# Patient Record
Sex: FEMALE | Race: WHITE | Employment: FULL TIME | URBAN - METROPOLITAN AREA
[De-identification: names, ages, dates, MRNs, and addresses within clinical notes are randomized per-mention and may not be internally consistent; named-entity substitution may affect disease eponyms.]

---

## 2020-07-20 ENCOUNTER — OFFICE VISIT (OUTPATIENT)
Dept: FAMILY MEDICINE | Facility: CLINIC | Age: 20
End: 2020-07-20
Payer: COMMERCIAL

## 2020-07-20 VITALS
BODY MASS INDEX: 20.92 KG/M2 | TEMPERATURE: 98.3 F | HEIGHT: 68 IN | DIASTOLIC BLOOD PRESSURE: 74 MMHG | OXYGEN SATURATION: 100 % | RESPIRATION RATE: 18 BRPM | SYSTOLIC BLOOD PRESSURE: 107 MMHG | WEIGHT: 138 LBS | HEART RATE: 84 BPM

## 2020-07-20 DIAGNOSIS — R07.89 OTHER CHEST PAIN: Primary | ICD-10-CM

## 2020-07-20 RX ORDER — DESOGESTREL AND ETHINYL ESTRADIOL 0.15-0.03
1 KIT ORAL DAILY
COMMUNITY
Start: 2020-05-11

## 2020-07-20 SDOH — HEALTH STABILITY: MENTAL HEALTH: HOW OFTEN DO YOU HAVE A DRINK CONTAINING ALCOHOL?: 2-4 TIMES A MONTH

## 2020-07-20 SDOH — HEALTH STABILITY: MENTAL HEALTH: HOW MANY STANDARD DRINKS CONTAINING ALCOHOL DO YOU HAVE ON A TYPICAL DAY?: 1 OR 2

## 2020-07-20 SDOH — HEALTH STABILITY: MENTAL HEALTH: HOW OFTEN DO YOU HAVE 6 OR MORE DRINKS ON ONE OCCASION?: LESS THAN MONTHLY

## 2020-07-20 ASSESSMENT — MIFFLIN-ST. JEOR: SCORE: 1444.33

## 2020-07-20 NOTE — PROGRESS NOTES
Chief Complaint   Patient presents with     Establish Care     on and off chest pains for about 4 yrs now. No actual testings have been performed.      Medication Reconciliation     Add contraceptives from [facility]. Otherwise not taking any meds at this time.             HPI:       Marisa Nur is a 20 year old  female without a significant past medical history who presents for the new concern(s) of    1. Chest pain: 4 years.  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 Has a sharp pain sometimes last a few seconds, does not stop her from doing her activities. No other symptoms, does not radiate, only once did it go to her left shoulder while on a walk with her mom, no diaphoresis, no nausea/vomiting. Unsure of how the pain goes away, tries to make her chest more open/stretches.   Has been on OCP for 2 years. No swelling in lower legs.Caffeine, 1 cup of coffee per day, no energy drinks, no dietary supplements, no diet sodas    Does not work out, no tobacco, no diabetes or high blood pressure, maternal grandmother with diabetes, no family history of sudden death or cardiac disease. No history of blood clots.     Plans to go to school [employer] at education degree in secondary math.   Does plan to go back to school in the fall.         PMHX:     There is no problem list on file for this patient.      Current Outpatient Medications   Medication Sig Dispense Refill     APRI 0.15-30 MG-MCG tablet Take 1 tablet by mouth daily         Social History     Socioeconomic History     Marital status: Single     Spouse name: Not on file     Number of children: Not on file     Years of education: Not on file     Highest education level: Not on file   Occupational History     Not on file   Social Needs     Financial resource strain: Not on file     Food insecurity     Worry: Not on file     Inability: Not  "on file     Transportation needs     Medical: Not on file     Non-medical: Not on file   Tobacco Use     Smoking status: Never Smoker     Smokeless tobacco: Never Used   Substance and Sexual Activity     Alcohol use: Yes     Frequency: 2-4 times a month     Drinks per session: 1 or 2     Binge frequency: Less than monthly     Drug use: Never     Sexual activity: Never   Lifestyle     Physical activity     Days per week: Not on file     Minutes per session: Not on file     Stress: Not on file   Relationships     Social connections     Talks on phone: Not on file     Gets together: Not on file     Attends Zoroastrianism service: Not on file     Active member of club or organization: Not on file     Attends meetings of clubs or organizations: Not on file     Relationship status: Not on file     Intimate partner violence     Fear of current or ex partner: Not on file     Emotionally abused: Not on file     Physically abused: Not on file     Forced sexual activity: Not on file   Other Topics Concern     Not on file   Social History Narrative     Not on file        No Known Allergies    No results found for this or any previous visit (from the past 24 hour(s)).         Review of Systems:   C: NEGATIVE for fatigue, unexpected change in weight  E: NEGATIVE for acute vision problems or changes  R: NEGATIVE for significant cough or shortness of breath  PSYCHIATRIC: Positive for feelings of overwhelm          Physical Exam:     Vitals:    07/20/20 0820   BP: 107/74   Pulse: 84   Resp: 18   Temp: 98.3  F (36.8  C)   TempSrc: Oral   SpO2: 100%   Weight: 62.6 kg (138 lb)   Height: 1.735 m (5' 8.31\")     Body mass index is 20.79 kg/m .    GENERAL APPEARANCE: healthy, alert and no distress,  EYES: Eyes grossly normal to inspection,  PERRL  HENT: ear canals and TM's normal and nose and mouth without ulcers or lesions  NECK: no adenopathy, no asymmetry, masses, or scars and thyroid normal to palpation  RESP: lungs clear to auscultation - " no rales, rhonchi or wheezes  CV: regular rate and rhythm,  and no murmur, click,  rub or gallop  ABDOMEN: soft, nontender, without hepatosplenomegaly or masses  MS: extremities normal- no gross deformities noted    EKG was performed and personally reviewed. NSR. No acute findings.   Assessment and Plan     1. Other chest pain  Her chest pain does not seem to be consistent with cardiac etiology as due to duration of symptoms and minimal risk factors. Also does not seem to be PE with intermittent symptoms, O2 sat of 100% but is on OCPs. Could be due to anxiety however her BRANDO is 2.  - EKG 12-lead complete w/read - Clinics    Reassurance provided. Recommended she start a yoga practice to stretch her pectoralis muscles and help ease her feelings of anxiety.  Discussed the option of medications and/or therapy. She will think about it and let us know.    She will consider an appointment for pap/pelvic in the near future.      Options for treatment and follow-up care were reviewed with the patient and/or guardian. Marisa Nur and/or guardian engaged in the decision making process and verbalized understanding of the options discussed and agreed with the final plan.    Phuong Hendrickson,

## 2020-07-21 ASSESSMENT — ANXIETY QUESTIONNAIRES
3. WORRYING TOO MUCH ABOUT DIFFERENT THINGS: SEVERAL DAYS
GAD7 TOTAL SCORE: 2
1. FEELING NERVOUS, ANXIOUS, OR ON EDGE: SEVERAL DAYS
2. NOT BEING ABLE TO STOP OR CONTROL WORRYING: NOT AT ALL
IF YOU CHECKED OFF ANY PROBLEMS ON THIS QUESTIONNAIRE, HOW DIFFICULT HAVE THESE PROBLEMS MADE IT FOR YOU TO DO YOUR WORK, TAKE CARE OF THINGS AT HOME, OR GET ALONG WITH OTHER PEOPLE: NOT DIFFICULT AT ALL
6. BECOMING EASILY ANNOYED OR IRRITABLE: NOT AT ALL
7. FEELING AFRAID AS IF SOMETHING AWFUL MIGHT HAPPEN: NOT AT ALL
5. BEING SO RESTLESS THAT IT IS HARD TO SIT STILL: NOT AT ALL

## 2020-07-21 ASSESSMENT — PATIENT HEALTH QUESTIONNAIRE - PHQ9
5. POOR APPETITE OR OVEREATING: NOT AT ALL
SUM OF ALL RESPONSES TO PHQ QUESTIONS 1-9: 1

## 2020-07-22 ASSESSMENT — ANXIETY QUESTIONNAIRES: GAD7 TOTAL SCORE: 2

## 2021-01-04 ENCOUNTER — HEALTH MAINTENANCE LETTER (OUTPATIENT)
Age: 21
End: 2021-01-04

## 2021-05-28 ENCOUNTER — RECORDS - HEALTHEAST (OUTPATIENT)
Dept: ADMINISTRATIVE | Facility: CLINIC | Age: 21
End: 2021-05-28

## 2021-08-13 ENCOUNTER — HOSPITAL ENCOUNTER (EMERGENCY)
Facility: CLINIC | Age: 21
Discharge: HOME | End: 2021-08-13
Admitting: EMERGENCY MEDICINE
Payer: COMMERCIAL

## 2021-08-13 ENCOUNTER — APPOINTMENT (OUTPATIENT)
Dept: CT IMAGING | Facility: CLINIC | Age: 21
End: 2021-08-13
Payer: COMMERCIAL

## 2021-08-13 VITALS
DIASTOLIC BLOOD PRESSURE: 64 MMHG | RESPIRATION RATE: 16 BRPM | TEMPERATURE: 98 F | OXYGEN SATURATION: 100 % | BODY MASS INDEX: 21.85 KG/M2 | WEIGHT: 145 LBS | HEART RATE: 87 BPM | SYSTOLIC BLOOD PRESSURE: 113 MMHG

## 2021-08-13 DIAGNOSIS — N12 PYELONEPHRITIS: ICD-10-CM

## 2021-08-13 DIAGNOSIS — R10.31 RLQ ABDOMINAL PAIN: ICD-10-CM

## 2021-08-13 LAB
ALBUMIN UR-MCNC: NEGATIVE MG/DL
ANION GAP SERPL CALCULATED.3IONS-SCNC: 13 MMOL/L (ref 5–18)
APPEARANCE UR: CLEAR
BACTERIA #/AREA URNS HPF: ABNORMAL /HPF
BILIRUB UR QL STRIP: NEGATIVE
BUN SERPL-MCNC: 6 MG/DL (ref 8–22)
CALCIUM SERPL-MCNC: 9.1 MG/DL (ref 8.5–10.5)
CHLORIDE BLD-SCNC: 106 MMOL/L (ref 98–107)
CO2 SERPL-SCNC: 20 MMOL/L (ref 22–31)
COLOR UR AUTO: ABNORMAL
CREAT SERPL-MCNC: 0.76 MG/DL (ref 0.6–1.1)
ERYTHROCYTE [DISTWIDTH] IN BLOOD BY AUTOMATED COUNT: 13.3 % (ref 10–15)
GFR SERPL CREATININE-BSD FRML MDRD: >90 ML/MIN/1.73M2
GLUCOSE BLD-MCNC: 79 MG/DL (ref 70–125)
GLUCOSE UR STRIP-MCNC: NEGATIVE MG/DL
HCG UR QL: NEGATIVE
HCT VFR BLD AUTO: 37.6 % (ref 35–47)
HGB BLD-MCNC: 12.5 G/DL (ref 11.7–15.7)
HGB UR QL STRIP: ABNORMAL
INTERNAL QC OK POCT: NORMAL
KETONES UR STRIP-MCNC: ABNORMAL MG/DL
LEUKOCYTE ESTERASE UR QL STRIP: NEGATIVE
MCH RBC QN AUTO: 28.7 PG (ref 26.5–33)
MCHC RBC AUTO-ENTMCNC: 33.2 G/DL (ref 31.5–36.5)
MCV RBC AUTO: 86 FL (ref 78–100)
NITRATE UR QL: NEGATIVE
PH UR STRIP: 6 [PH] (ref 5–7)
PLATELET # BLD AUTO: 172 10E3/UL (ref 150–450)
POTASSIUM BLD-SCNC: 3.9 MMOL/L (ref 3.5–5)
RBC # BLD AUTO: 4.35 10E6/UL (ref 3.8–5.2)
RBC URINE: 2 /HPF
SODIUM SERPL-SCNC: 139 MMOL/L (ref 136–145)
SP GR UR STRIP: 1 (ref 1–1.03)
SQUAMOUS EPITHELIAL: 1 /HPF
UROBILINOGEN UR STRIP-MCNC: <2 MG/DL
WBC # BLD AUTO: 8.7 10E3/UL (ref 4–11)
WBC URINE: 3 /HPF

## 2021-08-13 PROCEDURE — 36415 COLL VENOUS BLD VENIPUNCTURE: CPT | Performed by: NURSE PRACTITIONER

## 2021-08-13 PROCEDURE — 250N000011 HC RX IP 250 OP 636: Performed by: NURSE PRACTITIONER

## 2021-08-13 PROCEDURE — 85014 HEMATOCRIT: CPT | Performed by: NURSE PRACTITIONER

## 2021-08-13 PROCEDURE — 99285 EMERGENCY DEPT VISIT HI MDM: CPT | Mod: 25

## 2021-08-13 PROCEDURE — 81001 URINALYSIS AUTO W/SCOPE: CPT | Performed by: NURSE PRACTITIONER

## 2021-08-13 PROCEDURE — 81025 URINE PREGNANCY TEST: CPT | Performed by: NURSE PRACTITIONER

## 2021-08-13 PROCEDURE — 80048 BASIC METABOLIC PNL TOTAL CA: CPT | Performed by: NURSE PRACTITIONER

## 2021-08-13 PROCEDURE — 96374 THER/PROPH/DIAG INJ IV PUSH: CPT | Mod: 59

## 2021-08-13 PROCEDURE — 74177 CT ABD & PELVIS W/CONTRAST: CPT

## 2021-08-13 RX ORDER — CEFDINIR 300 MG/1
300 CAPSULE ORAL 2 TIMES DAILY
Qty: 20 CAPSULE | Refills: 0 | Status: SHIPPED | OUTPATIENT
Start: 2021-08-13 | End: 2021-08-23

## 2021-08-13 RX ORDER — KETOROLAC TROMETHAMINE 15 MG/ML
15 INJECTION, SOLUTION INTRAMUSCULAR; INTRAVENOUS ONCE
Status: COMPLETED | OUTPATIENT
Start: 2021-08-13 | End: 2021-08-13

## 2021-08-13 RX ORDER — IOPAMIDOL 755 MG/ML
100 INJECTION, SOLUTION INTRAVASCULAR ONCE
Status: COMPLETED | OUTPATIENT
Start: 2021-08-13 | End: 2021-08-13

## 2021-08-13 RX ADMIN — IOPAMIDOL 100 ML: 755 INJECTION, SOLUTION INTRAVENOUS at 12:52

## 2021-08-13 RX ADMIN — KETOROLAC TROMETHAMINE 15 MG: 15 INJECTION, SOLUTION INTRAMUSCULAR; INTRAVENOUS at 12:15

## 2021-08-13 ASSESSMENT — ENCOUNTER SYMPTOMS
DYSURIA: 0
NAUSEA: 0
ABDOMINAL PAIN: 1
VOMITING: 0
FEVER: 0
FREQUENCY: 0

## 2021-08-13 NOTE — ED TRIAGE NOTES
Patient is here with RLQ pain that started last night and is continuing to worsen. She denies any issues with bowel or bladder.

## 2021-08-13 NOTE — ED PROVIDER NOTES
EMERGENCY DEPARTMENT ENCOUNTER      NAME: Marisa Nur  AGE: 21 year old female  YOB: 2000  MRN: [id number]  EVALUATION DATE & TIME: 2021 11:35 AM    PCP: Phuong Hendrickson    ED PROVIDER: NESSA Pisano CNP      Chief Complaint   Patient presents with     Abdominal Pain         FINAL IMPRESSION:  1. Pyelonephritis    2. RLQ abdominal pain          ED COURSE & MEDICAL DECISION MAKIN:42 AM I met with the patient, obtained history, performed an initial exam, and discussed options and plan for treatment here in the ED.  1:47 PM I rechecked and updated the patient. Updated her on the CT Abdomen Pelvis results.    Pertinent Labs & Imaging studies reviewed. (See chart for details)  21 year old female presents to the Emergency Department for evaluation of abdominal pain. Tender in the RLQ. No peritoneal signs. DDx include appendicitis, upper and lower UTI, ovarian cyst, colitis, ectopic pregnancy amongst other. CT abdomen and pelvis obtained. Shows a lesion in the right kidney and thickening of the bladder wall. Moderate bacterial in urine though WBC's, leukocyte esterase, or nitrites. We discussed the CT findings. At this point, infection would seem most likely. She is non toxic. Will start on omnicef. Recommend follow up in clinic next week. Advised to return for new / worsening symptoms or for other concerns. Recommend follow up CT in 3 months per radiologist recommendations.     At the conclusion of the encounter I discussed the results of all of the tests and the disposition. The questions were answered. The patient or family acknowledged understanding and was agreeable with the care plan.        PPE: Provider wore gloves, N95 mask, eye protection, and paper mask.   MEDICATIONS GIVEN IN THE EMERGENCY:  Medications   ketorolac (TORADOL) injection 15 mg (15 mg Intravenous Given 21 1215)   iopamidol (ISOVUE-370) solution 100 mL (100 mLs Intravenous Given 21 1252)       NEW  "PRESCRIPTIONS STARTED AT TODAY'S ER VISIT  Discharge Medication List as of 8/13/2021  2:41 PM      START taking these medications    Details   cefdinir (OMNICEF) 300 MG capsule Take 1 capsule (300 mg) by mouth 2 times daily for 10 days, Disp-20 capsule, R-0, Local Print                  =================================================================    HPI    Patient information was obtained from: The patient    Use of Intrepreter: N/A        Marisa Nur is a 21 year old female with a no recorded pertinent history who presents to the ED via walk-in for evaluation of abdominal pain.    The patient reports last night (8/12) the patient had onset of localized RLQ abdominal pain that has since progressively worsened. Movement is a provocative feature. Additionally, the patient notes \"nothing seems appetizing\" and has not had anything to eat today. She was drinking coffee on her way to the ED. Patient denies having similar symptoms before. The patient currently takes birth control, she has not taken any medications for her symptoms. The patient denies history of abdominal surgeries. Denies known allergies. Denies fever, nausea, vomiting, dysuria, vaginal bleeding, vaginal discharge, frequency, and any other symptoms or complaints at this time.       REVIEW OF SYSTEMS   Review of Systems   Constitutional: Negative for fever.   Gastrointestinal: Positive for abdominal pain (RLQ). Negative for nausea and vomiting.   Genitourinary: Negative for dysuria, frequency, vaginal bleeding and vaginal discharge.   All other systems reviewed and are negative.       PAST MEDICAL HISTORY:  History reviewed. No pertinent past medical history.    PAST SURGICAL HISTORY:  Past Surgical History:   Procedure Laterality Date     NO HISTORY OF SURGERY             CURRENT MEDICATIONS:    Prior to Admission Medications   Prescriptions Last Dose Informant Patient Reported? Taking?   APRI 0.15-30 MG-MCG tablet   Yes No   Sig: Take 1 tablet " by mouth daily      Facility-Administered Medications: None           ALLERGIES:  Allergies   Allergen Reactions     Adhesive Tape Rash       FAMILY HISTORY:  Family History   Problem Relation Age of Onset     Diabetes Maternal Grandmother      Cancer Paternal Great-Grandmother        SOCIAL HISTORY:   Social History     Socioeconomic History     Marital status: Single     Spouse name: None     Number of children: None     Years of education: 3 years college     Highest education level: None   Occupational History     None   Tobacco Use     Smoking status: Never Smoker     Smokeless tobacco: Never Used   Substance and Sexual Activity     Alcohol use: Yes     Drug use: Never     Sexual activity: Never   Other Topics Concern     None   Social History Narrative     None     Social Determinants of Health     Financial Resource Strain:      Difficulty of Paying Living Expenses:    Food Insecurity:      Worried About Running Out of Food in the Last Year:      Ran Out of Food in the Last Year:    Transportation Needs:      Lack of Transportation (Medical):      Lack of Transportation (Non-Medical):    Physical Activity:      Days of Exercise per Week:      Minutes of Exercise per Session:    Stress:      Feeling of Stress :    Social Connections:      Frequency of Communication with Friends and Family:      Frequency of Social Gatherings with Friends and Family:      Attends [employer] Services:      Active Member of Clubs or Organizations:      Attends Club or Organization Meetings:      Marital Status:    Intimate Partner Violence:      Fear of Current or Ex-Partner:      Emotionally Abused:      Physically Abused:      Sexually Abused:          VITALS:  Patient Vitals for the past 24 hrs:   BP Temp Temp src Pulse Resp SpO2 Weight   08/13/21 1446 113/64 98  F (36.7  C) Oral 87 16 100 % --   08/13/21 1342 97/58 -- -- 69 -- 100 % --   08/13/21 1121 125/72 98.2  F (36.8  C) Temporal 93 16 100 % 65.8 kg (145 lb)        PHYSICAL EXAM    Constitutional:  Well developed, well nourished, no acute distress  EYES: Conjunctivae clear  HENT:  Atraumatic, normocephalic  Respiratory:  No respiratory distress, normal breath sounds  Cardiovascular:  Normal rate, normal rhythm, no murmurs  GI:  Soft, nondistended, RLQ tenderness. No rebound or guarding.   Integument: Warm, Dry, No erythema, No rash.   Neurologic:  Alert & oriented x 3     LAB:  All pertinent labs reviewed and interpreted.  Results for orders placed or performed during the hospital encounter of 08/13/21   CT Abdomen Pelvis w Contrast    Impression    IMPRESSION:   1.  Right mid renal lesion measuring up to 3.5 cm. Given the patient's age and assuming acute onset of symptoms, focal pyelonephritis is the primary differential consideration. Recommend laboratory correlation. The appearance is not typical for renal   neoplasm. As a precaution, recommend imaging follow-up in 3 months/after resolution of the patient's symptoms to exclude an underlying lesion.  2.  Mild bladder wall thickening which may reflect cystitis.  3.  Normal appendix.     UA with Microscopic reflex to Culture    Specimen: Urine, Clean Catch   Result Value Ref Range    Color Urine Light Yellow Colorless, Straw, Light Yellow, Yellow    Appearance Urine Clear Clear    Glucose Urine Negative Negative mg/dL    Bilirubin Urine Negative Negative    Ketones Urine Trace (A) Negative mg/dL    Specific Gravity Urine 1.005 1.001 - 1.030    Blood Urine 0.06 mg/dL (A) Negative    pH Urine 6.0 5.0 - 7.0    Protein Albumin Urine Negative Negative mg/dL    Urobilinogen Urine <2.0 <2.0 mg/dL    Nitrite Urine Negative Negative    Leukocyte Esterase Urine Negative Negative    Bacteria Urine Moderate (A) None Seen /HPF    RBC Urine 2 <=2 /HPF    WBC Urine 3 <=5 /HPF    Squamous Epithelials Urine 1 <=1 /HPF   CBC (+ platelets, no diff)   Result Value Ref Range    WBC Count 8.7 4.0 - 11.0 10e3/uL    RBC Count 4.35 3.80 - 5.20  10e6/uL    Hemoglobin 12.5 11.7 - 15.7 g/dL    Hematocrit 37.6 35.0 - 47.0 %    MCV 86 78 - 100 fL    MCH 28.7 26.5 - 33.0 pg    MCHC 33.2 31.5 - 36.5 g/dL    RDW 13.3 10.0 - 15.0 %    Platelet Count 172 150 - 450 10e3/uL   Basic metabolic panel   Result Value Ref Range    Sodium 139 136 - 145 mmol/L    Potassium 3.9 3.5 - 5.0 mmol/L    Chloride 106 98 - 107 mmol/L    Carbon Dioxide (CO2) 20 (L) 22 - 31 mmol/L    Anion Gap 13 5 - 18 mmol/L    Urea Nitrogen 6 (L) 8 - 22 mg/dL    Creatinine 0.76 0.60 - 1.10 mg/dL    Calcium 9.1 8.5 - 10.5 mg/dL    Glucose 79 70 - 125 mg/dL    GFR Estimate >90 >60 mL/min/1.73m2   HCG qualitative urine POCT   Result Value Ref Range    HCG Qual Urine Negative Negative    Internal QC Check POCT Valid Valid       RADIOLOGY:  Reviewed all pertinent imaging. Please see official radiology report.  CT Abdomen Pelvis w Contrast   Final Result   IMPRESSION:    1.  Right mid renal lesion measuring up to 3.5 cm. Given the patient's age and assuming acute onset of symptoms, focal pyelonephritis is the primary differential consideration. Recommend laboratory correlation. The appearance is not typical for renal    neoplasm. As a precaution, recommend imaging follow-up in 3 months/after resolution of the patient's symptoms to exclude an underlying lesion.   2.  Mild bladder wall thickening which may reflect cystitis.   3.  Normal appendix.               PROCEDURES:   None      I, Sekou Tyler, am serving as a scribe to document services personally performed by Ej Bolivar CNP. based on my observation and the provider's statements to me. I, Ej Bolivar CNP attest that Sekou Tyler is acting in a scribe capacity, has observed my performance of the services and has documented them in accordance with my direction.    NESSA Pisano, CNP  Emergency Medicine  [facility] EMERGENCY ROOM  [address]  [id number]  [phone]  Dept: [phone]           Ej Bolivar, APRN CNP  08/13/21 1455

## 2021-08-13 NOTE — DISCHARGE INSTRUCTIONS
"YOU HAVE A KIDNEY INFECTION.    Your CT scan did show a \"lesion\" in the right kidney which would most likely be focal infection. The radiologist does recommend a repeat CT scan in 3 months.    TO CARE FOR YOURSELF AT HOME:   Take the antibiotic as prescribed.   For your pain, please use Ibuprofen 600mg every 6 hours as needed for pain.    Call clinic to schedule a recheck in 5-7 days.    We are culturing your urine, please have your doctor check the urine cultureresults to be sure your infection will be treated effectively with the antibiotic you were given.  The urine culture results will be back in 2 to 3 days.   If your symptoms worsen prior to your followup appointment, do not hesitate to return here to the emergency department for further evaluation. We'd be happy to see you again.       ---------------------------------------------------------------------------------------------------    "

## 2021-10-10 ENCOUNTER — HEALTH MAINTENANCE LETTER (OUTPATIENT)
Age: 21
End: 2021-10-10

## 2022-01-30 ENCOUNTER — HEALTH MAINTENANCE LETTER (OUTPATIENT)
Age: 22
End: 2022-01-30

## 2022-09-24 ENCOUNTER — HEALTH MAINTENANCE LETTER (OUTPATIENT)
Age: 22
End: 2022-09-24

## 2023-05-08 ENCOUNTER — HEALTH MAINTENANCE LETTER (OUTPATIENT)
Age: 23
End: 2023-05-08